# Patient Record
(demographics unavailable — no encounter records)

---

## 2024-10-29 NOTE — REASON FOR VISIT
[FreeTextEntry1] : Matias Ospina 79 years old here for second opinion concerning his cardiac status. He has been followed carefully by his cardiologist Dr. Rodriguez and his internist Dr. Salas.  He was seen on October 29, 2024

## 2024-10-29 NOTE — DISCUSSION/SUMMARY
[FreeTextEntry1] : 1.  I have scheduled patient for transesophageal echocardiogram at [facility] 2.  Pending the results cardiac catheterization will probably need to be performed both right and left 3.  I will try to obtain the results of his most recent blood work with Dr. Salas [EKG obtained to assist in diagnosis and management of assessed problem(s)] : EKG obtained to assist in diagnosis and management of assessed problem(s)

## 2024-10-29 NOTE — HISTORY OF PRESENT ILLNESS
[FreeTextEntry1] : The patient is 79 years old with a history of hypertension chronic fatigue does not do much activity but recently has been more short of breath. He has had noninvasive evaluation in the past and on some occasion his aortic insufficiency was read as severe and at other times more mild to moderate.  In addition LV function was read as an ejection fraction of 37%.  He has a history of depression on medication resperizole.  He is also on low-dose beta-blocker and spironolactone  He also is in need of bladder surgery, he has a history of nephrolithiasis and is followed carefully by urology 2D echo was performed at a [facility] facility in September 24 and was read as mild aortic insufficiency low normal ejection fraction no wall motion abnormality was read. I had the echo reviewed by a dedicated echocardiographer who felt he had probably moderate to severe mitral insufficiency mild to moderate aortic insufficiency and segmental LV wall motion dysfunction suggestive of possible coronary disease  The patient and the daughter are very confused about exactly the nature of his cardiac disease.  His symptoms remain slightly progressive which shortness of breath with mild exertion and fatigue  EKG October 29, 2024 normal sinus rhythm nonspecific ST depression laterally

## 2024-10-29 NOTE — ASSESSMENT
[FreeTextEntry1] : aldo Ospina has chronic fatigue and progressive shortness of breath.  He has had a conflicting number of echoes suggesting moderate LV dysfunction aortic insufficiency severe to mild and most recent echo that may be more compatible with mitral regurgitation and some segmental LV dysfunction  The patient is in need of urologic procedure but his cardiac situation is ill-defined  I therefore recommended that he undergo a transesophageal echocardiogram to clearly evaluate his valvular disease it severity as well as LV function  Pending on the results the patient will probably need to go for cardiac catheterization to evaluate him for possible ischemic disease as well

## 2024-10-29 NOTE — PHYSICAL EXAM
[Well Developed] : well developed [Well Nourished] : well nourished [Normal Venous Pressure] : normal venous pressure [No Carotid Bruit] : no carotid bruit [Normal S1, S2] : normal S1, S2 [Clear Lung Fields] : clear lung fields [Good Air Entry] : good air entry [Soft] : abdomen soft [Non Tender] : non-tender [No Edema] : no edema [Normal DP B/L] : normal DP B/L [de-identified] : Mildly dyspneic [de-identified] : 1/6 systolic murmur at the apex 1/4 diastolic blow at the right base

## 2025-01-07 NOTE — REASON FOR VISIT
[FreeTextEntry1] : Matias Ospina 79 years old with a nonischemic cardiomyopathy moderate aortic insufficiency here for follow-up of his cardiac status after his cardiac catheterization.  He was seen on January 7, 2025

## 2025-01-07 NOTE — DISCUSSION/SUMMARY
[FreeTextEntry1] : 1.  Restart spironolactone 25 and Farxiga 10 2.  I wanted to start him on Entresto but he is concerned about the cost and probably will not take it.  I therefore prescribed losartan 50 mg a day 3.  The patient is hemodynamically stable and I see no cardiac contraindication presently for bladder or prostate surgery by urology  Patient should follow-up with me again in 1 month to make certain he is tolerating the medications.  He apparently is also on prednisone given to him by Dr. Salas for some arthritis which I am sure short-term

## 2025-01-07 NOTE — HISTORY OF PRESENT ILLNESS
[FreeTextEntry1] : The patient is 79 years old with a history of hypertension chronic fatigue does not do much activity but recently has been more short of breath. He has had noninvasive evaluation in the past and on some occasion his aortic insufficiency was read as severe and at other times more mild to moderate.  In addition LV function was read as an ejection fraction of 37%.  He has a history of depression on medication resperizole.  He is also on low-dose beta-blocker and spironolactone  He also is in need of bladder surgery, he has a history of nephrolithiasis and is followed carefully by urology 2D echo was performed at a [facility] facility in September 24 and was read as mild aortic insufficiency low normal ejection fraction no wall motion abnormality was read. I had the echo reviewed by a dedicated echocardiographer who felt he had probably moderate to severe mitral insufficiency mild to moderate aortic insufficiency and segmental LV wall motion dysfunction suggestive of possible coronary disease  The patient and the daughter are very confused about exactly the nature of his cardiac disease.  His symptoms remain slightly progressive which shortness of breath with mild exertion and fatigue  EKG October 29, 2024 normal sinus rhythm nonspecific ST depression laterally  A subsequent transesophageal echocardiogram revealed moderate to severe global LV dysfunction with moderate aortic insufficiency.  December 2024 Subsequent to this he underwent a right and left heart cardiac catheterization which revealed normal right heart pressures normal wedge decreased cardiac output normal coronary arteries  Visit January 7, 2025 Patient does have some exertional shortness of breath and continues to be depressed and is noncompliant with medications.  He was given spironolactone and Farxiga but has not been taking them.  He is on 2 medications risperidone all and S-Citalopram for depression.  He denies chest pain edema orthopnea PND but does have exertional shortness of breath.  He is in need of bladder surgery and probably prostate surgery

## 2025-01-07 NOTE — PHYSICAL EXAM
[Well Developed] : well developed [Well Nourished] : well nourished [Normal Conjunctiva] : normal conjunctiva [Normal Venous Pressure] : normal venous pressure [No Carotid Bruit] : no carotid bruit [Normal S1, S2] : normal S1, S2 [Clear Lung Fields] : clear lung fields [Good Air Entry] : good air entry [Soft] : abdomen soft [Non Tender] : non-tender [No Edema] : no edema [Normal DP B/L] : normal DP B/L [de-identified] : Mildly dyspneic but overall looks well somewhat depressed [de-identified] : 1/6 systolic murmur at the apex 1/4 diastolic blow at the right base

## 2025-01-07 NOTE — ASSESSMENT
[FreeTextEntry1] : aldo Ospina has chronic fatigue and progressive shortness of breath.  He has a nonischemic cardiomyopathy with moderate aortic insufficiency and normal coronary arteries with normal right heart pressures on recent catheterization in December 2024  The patient is in need of urologic procedure but his cardiac situation is ill-defined  Presently he is hemodynamically stable but is not compliant with medications that have been prescribed.  He needs to be on guideline medication for a dilated nonischemic cardiomyopathy with some degree of aortic insufficiency

## 2025-01-18 NOTE — ASSESSMENT
[FreeTextEntry1] : I reviewed the patient's CT scan images and the report.  He has numerous bladder stones present, I would estimate 8-10 which all appear to be about 1 cm in size, collectively more than 3 cm of stone burden.  He also has BPH that I would estimate to be moderate size based on CT findings, 50 to 60 cm estimated.  We discussed how the stones can influence urinary symptoms including some of the irritative symptoms he has been experiencing with urgency and urge incontinence along with potential obstruction of the urethra at the bladder neck.  He also has been having urinary tract infections and the presence of the stones in the bladder could be a nidus for those infections.  We reviewed that infections and stones both tend to occur when there is evidence of outlet obstruction from BPH.  The recommended strategy here to help minimize his symptoms and reduce the risk of additional bothersome urinary symptoms or infections would be for him to undergo laser lithotripsy of the bladder stones as well as a transurethral resection of the prostate.  I reviewed the reasoning for those recommendations to the patient and he is in agreement to have this operation performed.  At this time, he had recently seen his cardiologist, Dr. Sandy who had reportedly cleared him for surgery.  However, the patient is not currently taking any of the medications that have been recommended by Dr. Sandy and I have concerns about his respiratory status seen today in the office with him noticeably showing effort of breathing.  I do think he needs further optimization before an operation.  I messaged Dr. Sandy with this information and also printed the patient's medication list for him so that he could go to the pharmacy to make sure he has the medications recommended and then start to use them.  I asked him to see Dr. Sandy again to confirm he is medically optimized.  The patient will let me know once that has taken place and we will then potentially plan the operation.

## 2025-01-18 NOTE — LETTER BODY
[Dear  ___] : Dear  [unfilled], [Courtesy Letter:] : I had the pleasure of seeing your patient, [unfilled], in my office today. [Please see my note below.] : Please see my note below. [Consult Closing:] : Thank you very much for allowing me to participate in the care of this patient.  If you have any questions, please do not hesitate to contact me. [FreeTextEntry2] : Edwin Zelaya MD [address], [address] [place] [address] [FreeTextEntry3] : Sincerely,      Devyn Coreas MD, FACS Director of Urology Services, [facility] Chief of Urology, [facility]  of Urology   [facility], [place] [id number] P: [phone] F: [phone] [facility]

## 2025-01-18 NOTE — PHYSICAL EXAM
[Normal Appearance] : normal appearance [Well Groomed] : well groomed [General Appearance - In No Acute Distress] : no acute distress [Edema] : no peripheral edema [Respiration, Rhythm And Depth] : normal respiratory rhythm and effort [Exaggerated Use Of Accessory Muscles For Inspiration] : no accessory muscle use [Abdomen Soft] : soft [Abdomen Tenderness] : non-tender [Costovertebral Angle Tenderness] : no ~M costovertebral angle tenderness [Urinary Bladder Findings] : the bladder was normal on palpation [Normal Station and Gait] : the gait and station were normal for the patient's age [] : no rash [No Focal Deficits] : no focal deficits [Oriented To Time, Place, And Person] : oriented to person, place, and time [Affect] : the affect was normal [Mood] : the mood was normal [No Palpable Adenopathy] : no palpable adenopathy [de-identified] : labored breathing noted during conversation today.

## 2025-01-18 NOTE — LETTER BODY
[Dear  ___] : Dear  [unfilled], [Courtesy Letter:] : I had the pleasure of seeing your patient, [unfilled], in my office today. [Please see my note below.] : Please see my note below. [Consult Closing:] : Thank you very much for allowing me to participate in the care of this patient.  If you have any questions, please do not hesitate to contact me. [FreeTextEntry2] : Edwin Zelaya MD [address], [address] [place] [address] [FreeTextEntry3] : Sincerely,      Devyn Coreas MD, FACS Director of Urology Services, [facility] Chief of Urology, [facility]  of Urology   [facility], [place] [id number] P: [phone] F: [phone] [facility]

## 2025-01-18 NOTE — HISTORY OF PRESENT ILLNESS
[FreeTextEntry1] : JANIS TOMLIN presents to the office today. He is a 79 year-old man here today for bladder stones. He had gross hematuria and went to the hospital and had a CT.  He gets UTIs every 3months. His last one was November. He sees Dr. Zelaya. He took Tamsulosin in the past. He has a weak stream, intermittency, nocturia x3, urinary urgency with incontinence, and straining. He denies incomplete bladder emptying. He reports Dr. Zelaya does bladder scans showing he is empty.   He saw Dr. Hoenig in the past for kidney stones.   He is seeing a cardiologist for nonischemic cardiomyopathy and nonrheumatic aortic valve insufficiency. He is having shortness of breath. He was started on Farxiga, Losartan, and Spironolactone.

## 2025-01-18 NOTE — PHYSICAL EXAM
[Normal Appearance] : normal appearance [Well Groomed] : well groomed [General Appearance - In No Acute Distress] : no acute distress [Edema] : no peripheral edema [Respiration, Rhythm And Depth] : normal respiratory rhythm and effort [Exaggerated Use Of Accessory Muscles For Inspiration] : no accessory muscle use [Abdomen Soft] : soft [Abdomen Tenderness] : non-tender [Costovertebral Angle Tenderness] : no ~M costovertebral angle tenderness [Urinary Bladder Findings] : the bladder was normal on palpation [Normal Station and Gait] : the gait and station were normal for the patient's age [] : no rash [No Focal Deficits] : no focal deficits [Oriented To Time, Place, And Person] : oriented to person, place, and time [Affect] : the affect was normal [Mood] : the mood was normal [No Palpable Adenopathy] : no palpable adenopathy [de-identified] : labored breathing noted during conversation today.

## 2025-01-18 NOTE — HISTORY OF PRESENT ILLNESS
[FreeTextEntry1] : JANIS TOMLIN presents to the office today. He is a 79 year-old man here today for bladder stones. He had gross hematuria and went to the hospital and had a CT.  He gets UTIs every 3months. His last one was November. He sees Dr. Zelaya. He took Tamsulosin in the past. He has a weak stream, intermittency, nocturia x3, urinary urgency with incontinence, and straining. He denies incomplete bladder emptying. He reports Dr. Zelaay does bladder scans showing he is empty.   He saw Dr. Hoenig in the past for kidney stones.   He is seeing a cardiologist for nonischemic cardiomyopathy and nonrheumatic aortic valve insufficiency. He is having shortness of breath. He was started on Farxiga, Losartan, and Spironolactone.

## 2025-05-09 NOTE — REASON FOR VISIT
[FreeTextEntry1] : Matias Ospina 79 years old here for follow-up of his cardiac status.He was seen urgently as he appeared to be more short of breath to his urologist on May 9, 2025

## 2025-05-09 NOTE — PHYSICAL EXAM
[Well Developed] : well developed [Well Nourished] : well nourished [Normal Conjunctiva] : normal conjunctiva [Normal Venous Pressure] : normal venous pressure [No Carotid Bruit] : no carotid bruit [Normal S1, S2] : normal S1, S2 [Clear Lung Fields] : clear lung fields [Good Air Entry] : good air entry [Soft] : abdomen soft [Non Tender] : non-tender [No Edema] : no edema [Normal DP B/L] : normal DP B/L [de-identified] : Mildly dyspneic  Short of breath with mild exertionIt appears quite depressed and concerned about his overall health and the shortness of breath [de-identified] : 1/6 systolic murmur at the apex  I did not appreciate a diastolic murmur today

## 2025-05-09 NOTE — ASSESSMENT
[FreeTextEntry1] : aldo Ospina has chronic fatigue and progressive shortness of breath.  He has a nonischemic cardiomyopathy with moderate aortic insufficiency and normal coronary arteries with normal right heart pressures on recent catheterization in December 2024  The patient is in need of urologic procedure but his cardiac situation is ill-defined  In May 2025, he continues with shortness of breath that he claims is worse than before but he has clear lungs no edema normal oxygen saturation.  He appears quite depressed.He has a new right bundle branch block  1.  Exertional shortness of breath combination of nonischemic cardiomyopathy, previous smoker, Chronic hip pain and depression  2.  Chronic hip pain 3. Previous smoker 4.  Right bundle branch block 5.  Chronic depression

## 2025-05-09 NOTE — HISTORY OF PRESENT ILLNESS
[FreeTextEntry1] : The patient is 79 years old with a history of hypertension chronic fatigue does not do much activity but recently has been more short of breath. He has had noninvasive evaluation in the past and on some occasion his aortic insufficiency was read as severe and at other times more mild to moderate.  In addition LV function was read as an ejection fraction of 37%.  He has a history of depression on medication resperizole.  He is also on low-dose beta-blocker and spironolactone  He also is in need of bladder surgery, he has a history of nephrolithiasis and is followed carefully by urology 2D echo was performed at a [facility] facility in September 24 and was read as mild aortic insufficiency low normal ejection fraction no wall motion abnormality was read. I had the echo reviewed by a dedicated echocardiographer who felt he had probably moderate to severe mitral insufficiency mild to moderate aortic insufficiency and segmental LV wall motion dysfunction suggestive of possible coronary disease  The patient and the daughter are very confused about exactly the nature of his cardiac disease.  His symptoms remain slightly progressive which shortness of breath with mild exertion and fatigue  EKG October 29, 2024 normal sinus rhythm nonspecific ST depression laterally  A subsequent transesophageal echocardiogram revealed moderate to severe global LV dysfunction with moderate aortic insufficiency.  December 2024 Subsequent to this he underwent a right and left heart cardiac catheterization which revealed normal right heart pressures normal wedge decreased cardiac output normal coronary arteries  Visit January 7, 2025 Patient does have some exertional shortness of breath and continues to be depressed and is noncompliant with medications.  He was given spironolactone and Farxiga but has not been taking them.  He is on 2 medications risperidone all and S-Citalopram for depression.  He denies chest pain edema orthopnea PND but does have exertional shortness of breath.  He is in need of bladder surgery and probably prostate surgery  Visit February 11, 2025 The patient recently saw her urology concerning bladder surgery.  He apparently was not taking the medicines that I have prescribed because of cost.  He is now taking the Farxiga and the losartan.  He does complain of fatigue and does feel short of breath but is very limited in his walking because of hip pain and he walks slowly and is fatigued when he is walking.  He has no significant edema or chest pain.He has a nonischemic cardiomyopathy with mild to moderate aortic insufficiency and some mitral insufficiency  I was able to walk him and his saturation remained 94% and his heart rate increased to 75.  He walks slowly and is limited because of the pain in his hip  Visit May 9, 2025 Patient recently saw his urologist who found to be very short of breath and urged him to have an urgent visit with meDswetha that he is more short of breath than he has been in the past.  He stopped Farxiga because of costHe remains short of breath with minimal exertion he does not do much activity and remains depressed since the loss of his wife many years ago He has no edema no chest pain.  He has severe hip pain which impedes his ability to walk.   He walks slowly his saturation at rest was 94% went up to 98% with walking his heart rate did increase  EKG normal sinus rhythm now right bundle branch block QRS is not that wide nonspecific ST-T wave changes Right bundle branch block is new Patient was a previous smoker

## 2025-05-09 NOTE — DISCUSSION/SUMMARY
[FreeTextEntry1] : 1.  The patient is not taking Farxiga which I think will be of help to him.  We will try to get him some financial help with the Farxiga. 2. 2D echo to follow-up on his LV function considering the new right bundle branch block 3.  Discussed with Dr. Salas who will reevaluate his pulmonary status  Follow-up with me again in 2 months  [EKG obtained to assist in diagnosis and management of assessed problem(s)] : EKG obtained to assist in diagnosis and management of assessed problem(s)

## 2025-06-06 NOTE — HISTORY OF PRESENT ILLNESS
[FreeTextEntry1] : He is a 79 year-old man here today for bladder stones and recurrent UTIs.  He has seen multiple urologists in the past. He had gross hematuria and went to the hospital and had a CT in 11/2024.  He usually sees Dr Zelaya.  He also has seen Dr Coreas and Dr Hoenig here at [facility].   He typically gets UTIs every 3months. His last one was last month, he was given abx? by Dr. Zelaya. He took Tamsulosin in the past. He has a weak stream, intermittency, nocturia x3, urinary urgency with incontinence, and straining. He denies incomplete bladder emptying. He reports Dr. Zelaya does bladder scans showing he is empty.  He is seeing a cardiologist for nonischemic cardiomyopathy and nonrheumatic aortic valve insufficiency. He is having shortness of breath. He was started on Farxiga, Losartan, and Spironolactone.  He is not taking farxiga due to cost.  He is taking escitalopram, prednisone (for pain), flomax, risperidone, losartan.   CT from 11/2024 - numerous bladder stones cystitis, BPH.  No kidney stones or hydro, b/l renal cysts.   On review there appears - at least 10 stones  TTE 5/2025 with 40-45% EF, mild AS, mod AR, mild MR, technically difficult  Ucx 1/2025 neg

## 2025-06-09 NOTE — PHYSICAL EXAM
[Well Developed] : well developed [Well Nourished] : well nourished [Normal Conjunctiva] : normal conjunctiva [Normal Venous Pressure] : normal venous pressure [No Carotid Bruit] : no carotid bruit [Normal S1, S2] : normal S1, S2 [Clear Lung Fields] : clear lung fields [Good Air Entry] : good air entry [Soft] : abdomen soft [No Edema] : no edema [Non Tender] : non-tender [Normal DP B/L] : normal DP B/L [de-identified] : Mildly dyspneic  Short of breath with mild exertionIt appears quite depressed and concerned about his overall health and the shortness of breath [de-identified] : 1/6 systolic murmur at the apex  I did not appreciate a diastolic murmur today

## 2025-06-09 NOTE — ASSESSMENT
[FreeTextEntry1] : aldo Ospina has chronic fatigue and progressive shortness of breath.  He has a nonischemic cardiomyopathy with moderate aortic insufficiency and normal coronary arteries with normal right heart pressures on recent catheterization in December 2024  The patient is in need of urologic procedure but his Respiratory status shortness of breathAnxiety are quite significant  In May 2025, he continues with shortness of breath that he claims is worse than before but he has clear lungs no edema normal oxygen saturation.  He appears quite depressed.He has a new right bundle branch block  The etiologies of his shortness of breath fatigue is multifactorial including LV dysfunction which has improved, depression and anxiety, obstructive sleep apnea, probable underlying airways disease  1.  Exertional shortness of breath combination of nonischemic cardiomyopathy, previous smoker, Chronic hip pain and depression  2.  Chronic hip pain 3. Previous smoker 4.  Right bundle branch block 5.  Chronic depression

## 2025-06-09 NOTE — REASON FOR VISIT
[FreeTextEntry1] : Matias Ospina 79 years old here for preop cardiovascular evaluation prior to undergoing bladder surgery.. He was seen on June 9, 2025.  He was last seen on May 9, 2025

## 2025-06-09 NOTE — DISCUSSION/SUMMARY
[FreeTextEntry1] : At this point, I do not believe this patient is a candidate for anesthesia.He has multiple reasons for shortness of breath fatigue as noted above.He is not stable for any kind of invasive procedure  I discussed this in detail with Dr. Salas who agrees.  He should be managed medically follow-up with pulmonaryAnd avoid urologic surgery presently

## 2025-06-09 NOTE — HISTORY OF PRESENT ILLNESS
[FreeTextEntry1] : The patient is 79 years old with a history of hypertension chronic fatigue does not do much activity but recently has been more short of breath. He has had noninvasive evaluation in the past and on some occasion his aortic insufficiency was read as severe and at other times more mild to moderate.  In addition LV function was read as an ejection fraction of 37%.  He has a history of depression on medication resperizole.  He is also on low-dose beta-blocker and spironolactone  He also is in need of bladder surgery, he has a history of nephrolithiasis and is followed carefully by urology 2D echo was performed at a [facility] facility in September 24 and was read as mild aortic insufficiency low normal ejection fraction no wall motion abnormality was read. I had the echo reviewed by a dedicated echocardiographer who felt he had probably moderate to severe mitral insufficiency mild to moderate aortic insufficiency and segmental LV wall motion dysfunction suggestive of possible coronary disease  The patient and the daughter are very confused about exactly the nature of his cardiac disease.  His symptoms remain slightly progressive which shortness of breath with mild exertion and fatigue  EKG October 29, 2024 normal sinus rhythm nonspecific ST depression laterally  A subsequent transesophageal echocardiogram revealed moderate to severe global LV dysfunction with moderate aortic insufficiency.  December 2024 Subsequent to this he underwent a right and left heart cardiac catheterization which revealed normal right heart pressures normal wedge decreased cardiac output normal coronary arteries  Visit January 7, 2025 Patient does have some exertional shortness of breath and continues to be depressed and is noncompliant with medications.  He was given spironolactone and Farxiga but has not been taking them.  He is on 2 medications risperidone all and S-Citalopram for depression.  He denies chest pain edema orthopnea PND but does have exertional shortness of breath.  He is in need of bladder surgery and probably prostate surgery  Visit February 11, 2025 The patient recently saw her urology concerning bladder surgery.  He apparently was not taking the medicines that I have prescribed because of cost.  He is now taking the Farxiga and the losartan.  He does complain of fatigue and does feel short of breath but is very limited in his walking because of hip pain and he walks slowly and is fatigued when he is walking.  He has no significant edema or chest pain.He has a nonischemic cardiomyopathy with mild to moderate aortic insufficiency and some mitral insufficiency  I was able to walk him and his saturation remained 94% and his heart rate increased to 75.  He walks slowly and is limited because of the pain in his hip  Visit May 9, 2025 Patient recently saw his urologist who found to be very short of breath and urged him to have an urgent visit with me. He denies that he is more short of breath than he has been in the past.  He stopped Farxiga because of cost He remains short of breath with minimal exertion he does not do much activity and remains depressed since the loss of his wife many years ago He has no edema no chest pain.  He has severe hip pain which impedes his ability to walk.   He walks slowly his saturation at rest was 94% went up to 98% with walking his heart rate did increase  EKG normal sinus rhythm now right bundle branch block QRS is not that wide nonspecific ST-T wave changes Right bundle branch block is new Patient was a previous smoker  2D echo 5/9/2025 1. Technically difficult image quality. 2. There is significant beat to beat variation in systolic function the setting of atrial fibrillation. 3. Left ventricular wall thickness is mildly increased. Left ventricular systolic function is mildly to moderately decreased with an ejection fraction visually estimated at 40 to 45 %. 4. There is increased LV mass and eccentric hypertrophy. 5. The right ventricle is not well visualized. normal right ventricular systolic function. 6. Moderate aortic regurgitation. 7. Mild aortic stenosis. 8. Mild to moderate mitral regurgitation  Visit June 9, 2025 Patient was seen for reevaluation for possible bladder surgery.He is not taking Farxiga because of cost.  He is taking losartan he is not taking metoprololHe has obstructive sleep apnea and does not use his CPAP machine  He feels weak tired depressed.  He is unable to walk just a few steps.  At rest he is breathing at respiratory rate of 18-20 but his O2 saturation is 96 to 98% When he stands up he feels weak and short of breath.  His last echo showed mild to moderate LV dysfunction, his previous catheterization revealed a normal right heart catheterization normal coronaries